# Patient Record
Sex: FEMALE | Race: WHITE | NOT HISPANIC OR LATINO
[De-identification: names, ages, dates, MRNs, and addresses within clinical notes are randomized per-mention and may not be internally consistent; named-entity substitution may affect disease eponyms.]

---

## 2019-05-13 ENCOUNTER — FORM ENCOUNTER (OUTPATIENT)
Age: 74
End: 2019-05-13

## 2020-09-14 ENCOUNTER — FORM ENCOUNTER (OUTPATIENT)
Age: 75
End: 2020-09-14

## 2021-09-03 PROBLEM — Z00.00 ENCOUNTER FOR PREVENTIVE HEALTH EXAMINATION: Status: ACTIVE | Noted: 2021-09-03

## 2021-09-24 DIAGNOSIS — Z87.448 PERSONAL HISTORY OF OTHER DISEASES OF URINARY SYSTEM: ICD-10-CM

## 2021-09-24 DIAGNOSIS — Z87.891 PERSONAL HISTORY OF NICOTINE DEPENDENCE: ICD-10-CM

## 2021-09-24 DIAGNOSIS — Z87.39 PERSONAL HISTORY OF OTHER DISEASES OF THE MUSCULOSKELETAL SYSTEM AND CONNECTIVE TISSUE: ICD-10-CM

## 2021-09-24 DIAGNOSIS — Z86.79 PERSONAL HISTORY OF OTHER DISEASES OF THE CIRCULATORY SYSTEM: ICD-10-CM

## 2021-09-24 DIAGNOSIS — Z85.3 PERSONAL HISTORY OF MALIGNANT NEOPLASM OF BREAST: ICD-10-CM

## 2021-09-24 DIAGNOSIS — Z80.3 FAMILY HISTORY OF MALIGNANT NEOPLASM OF BREAST: ICD-10-CM

## 2021-09-24 DIAGNOSIS — Z78.9 OTHER SPECIFIED HEALTH STATUS: ICD-10-CM

## 2021-09-27 ENCOUNTER — APPOINTMENT (OUTPATIENT)
Dept: BREAST CENTER | Facility: CLINIC | Age: 76
End: 2021-09-27
Payer: MEDICARE

## 2021-09-27 VITALS — OXYGEN SATURATION: 98 % | DIASTOLIC BLOOD PRESSURE: 76 MMHG | HEART RATE: 62 BPM | SYSTOLIC BLOOD PRESSURE: 140 MMHG

## 2021-09-27 VITALS — HEIGHT: 65 IN | BODY MASS INDEX: 19.66 KG/M2 | WEIGHT: 118 LBS

## 2021-09-27 PROCEDURE — 99213 OFFICE O/P EST LOW 20 MIN: CPT

## 2021-09-27 RX ORDER — LOSARTAN POTASSIUM 100 MG/1
TABLET, FILM COATED ORAL
Refills: 0 | Status: ACTIVE | COMMUNITY

## 2021-09-27 RX ORDER — AMLODIPINE BESYLATE 5 MG/1
TABLET ORAL
Refills: 0 | Status: ACTIVE | COMMUNITY

## 2021-09-27 RX ORDER — ALPRAZOLAM 2 MG/1
TABLET ORAL
Refills: 0 | Status: ACTIVE | COMMUNITY

## 2021-09-27 RX ORDER — ATENOLOL 50 MG/1
TABLET ORAL
Refills: 0 | Status: ACTIVE | COMMUNITY

## 2021-09-27 RX ORDER — PANTOPRAZOLE SODIUM 40 MG/10ML
INJECTION, POWDER, FOR SOLUTION INTRAVENOUS
Refills: 0 | Status: ACTIVE | COMMUNITY

## 2021-09-27 RX ORDER — MIRABEGRON 50 MG/1
TABLET, FILM COATED, EXTENDED RELEASE ORAL
Refills: 0 | Status: ACTIVE | COMMUNITY

## 2021-09-27 NOTE — REASON FOR VISIT
[Follow-Up: _____] : a [unfilled] follow-up visit [FreeTextEntry1] : The patient comes in with a strong family history of breast cancer.  Patient herself has a personal history of bilateral breast cancer with an initial right breast cancer diagnosed in 1997 and then a left breast cancer treated by Dr. Vika Wan in 2010.  She was found to be BRCA2 positive.  She underwent bilateral nipple sparing mastectomies through an inframammary approach with bilateral direct to implant reconstruction on January 6, 2011.  She was on Arimidex for 8 years and then was switched to tamoxifen by Dr. Dunbar.  She comes in for routine yearly follow-up.

## 2021-09-27 NOTE — PHYSICAL EXAM
[Normocephalic] : normocephalic [Atraumatic] : atraumatic [EOMI] : extra ocular movement intact [Supple] : supple [No Supraclavicular Adenopathy] : no supraclavicular adenopathy [No Cervical Adenopathy] : no cervical adenopathy [Examined in the supine and seated position] : examined in the supine and seated position [No dominant masses] : no dominant masses in right breast  [No dominant masses] : no dominant masses left breast [No Nipple Retraction] : no left nipple retraction [No Nipple Discharge] : no left nipple discharge [Breast Mass Right Breast ___cm] : no masses [Breast Mass Left Breast ___cm] : no masses [Breast Nipple Inversion] : nipples not inverted [Breast Nipple Retraction] : nipples not retracted [Breast Nipple Flattening] : nipples not flattened [Breast Nipple Fissures] : nipples not fissured [Breast Abnormal Lactation (Galactorrhea)] : no galactorrhea [Breast Abnormal Secretion Bloody Fluid] : no bloody discharge [Breast Abnormal Secretion Serous Fluid] : no serous discharge [Breast Abnormal Secretion Opalescent Fluid] : no milky discharge [No Axillary Lymphadenopathy] : no left axillary lymphadenopathy [No Edema] : no edema [No Rashes] : no rashes [No Ulceration] : no ulceration [de-identified] : On exam, the patient has bilateral nipple sparing mastectomies with implant reconstructions.  She has an excellent cosmetic result.  I cannot feel any suspicious findings over either implant.  She has no axillary, supraclavicular, or cervical adenopathy.  She has no sensation in the right nipple areolar complex or in the right inferior or right lateral aspects.  In the left breast she has good sensation in all 4 quadrants as well as the nipple areolar complex.  He does have a nodule on her right shoulder and she is seeing her dermatologist at the beginning of November. [de-identified] : Status post nipple sparing mastectomy with implant reconstruction with no suspicious findings. [de-identified] : Status post nipple sparing mastectomy with implant reconstruction with no suspicious findings.

## 2021-09-27 NOTE — HISTORY OF PRESENT ILLNESS
[FreeTextEntry1] : The patient is a 75-year-old G2, P2 postmenopausal white female.  She underwent menarche at age 12 and underwent menopause at age 51.  She never took any hormone replacement therapy.  The patient has a strong family history with a paternal grandmother with breast cancer at age 47 and then  at age 52.  The patient's father had breast cancer at age 93.  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  She had 23 negative lymph nodes at that time and received CMF chemotherapy and radiation therapy to the right breast.  She took Evista for 10 years.  She was then found to have a left breast cancer at the 5:00 region and underwent a partial mastectomy and sentinel lymph node biopsy in  by Dr. Vika Wan in [facility] for 6 mm invasive duct cancer intermediate grade with no lymphovascular invasion.  She had 5 negative nodes at that time and the tumor was ER/NE positive (pathologic prognostic stage IA).  She underwent genetic testing at the time of her cancer in  and turned out to be BRCA2 positive with an [id number] mutation.  The patient then underwent bilateral nipple sparing mastectomies which I performed on 2011 with bilateral direct to implant reconstructions and the final pathology was benign.  She was just placed on Arimidex for the left breast cancer which she took for 10 years until .  Oncotype score the left breast cancer was 28.  She time thought she had textured implants but her implants are actually smooth..  She comes in for routine yearly exam.

## 2021-09-27 NOTE — ASSESSMENT
[FreeTextEntry1] : The patient is a 75-year-old G2, P2 postmenopausal white female.  She underwent menarche at age 12 and underwent menopause at age 51.  She never took any hormone replacement therapy.  The patient has a strong family history with a paternal grandmother with breast cancer at age 47 and then  at age 52.  The patient's father had breast cancer at age 93.  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  She had 23 negative lymph nodes at that time and received CMF chemotherapy and radiation therapy to the right breast.  She took Evista for 10 years.  She was then found to have a left breast cancer at the 5:00 region and underwent a partial mastectomy and sentinel lymph node biopsy in  by Dr. Vika Wan in [facility] for 6 mm invasive duct cancer intermediate grade with no lymphovascular invasion.  She had 5 negative nodes at that time and the tumor was ER/NH positive (pathologic prognostic stage IA).  She underwent genetic testing at the time of her cancer in  and turned out to be BRCA2 positive with an [id number] mutation.  The patient then underwent bilateral nipple sparing mastectomies which I performed on 2011 with bilateral direct to implant reconstructions and the final pathology was benign.  She was just placed on Arimidex for the left breast cancer.  Oncotype score the left breast cancer was 28.  She at one time thought she had textured implants but actually has smooth implants.  On exam today, I cannot feel any suspicious findings over either implant and she is an excellent cosmetic result.  The patient was reassured and stopped Arimidex after 10 years of treatment in .  She should follow-up again in 1 year in 2022 for routine clinical exam.  Of note, she does have a nodule on her right shoulder which will need to be biopsied and she has an appointment with her dermatologist in 2021.

## 2022-09-24 ENCOUNTER — TRANSCRIPTION ENCOUNTER (OUTPATIENT)
Age: 77
End: 2022-09-24

## 2022-10-06 ENCOUNTER — NON-APPOINTMENT (OUTPATIENT)
Age: 77
End: 2022-10-06

## 2022-10-11 ENCOUNTER — APPOINTMENT (OUTPATIENT)
Dept: BREAST CENTER | Facility: CLINIC | Age: 77
End: 2022-10-11

## 2022-10-11 VITALS
DIASTOLIC BLOOD PRESSURE: 77 MMHG | WEIGHT: 116 LBS | SYSTOLIC BLOOD PRESSURE: 158 MMHG | BODY MASS INDEX: 19.33 KG/M2 | HEART RATE: 53 BPM | OXYGEN SATURATION: 98 % | HEIGHT: 65 IN

## 2022-10-11 PROCEDURE — 99213 OFFICE O/P EST LOW 20 MIN: CPT

## 2022-10-11 NOTE — HISTORY OF PRESENT ILLNESS
[FreeTextEntry1] : The patient is a 76-year-old G2, P2 postmenopausal white female.  She underwent menarche at age 12 and underwent menopause at age 51.  She never took any hormone replacement therapy.  The patient has a strong family history with a paternal grandmother with breast cancer at age 47 and then  at age 52.  The patient's father had breast cancer at age 93.  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  She had 23 negative lymph nodes at that time and received CMF chemotherapy and radiation therapy to the right breast.  She took Evista for 10 years.  She was then found to have a left breast cancer at the 5:00 region and underwent a partial mastectomy and sentinel lymph node biopsy in  by Dr. Vika Wan in [facility] for 6 mm invasive duct cancer intermediate grade with no lymphovascular invasion.  She had 5 negative nodes at that time and the tumor was ER/WA positive (pathologic prognostic stage IA).  She underwent genetic testing at the time of her cancer in  and turned out to be BRCA2 positive with an [id number] mutation.  The patient then underwent bilateral nipple sparing mastectomies which I performed on 2011 with bilateral direct to implant reconstructions and the final pathology was benign.  She was just placed on Arimidex for the left breast cancer which she took for 10 years until .  Oncotype score the left breast cancer was 28.  She time thought she had textured implants but her implants are actually smooth..  She comes in for routine yearly exam.

## 2022-10-11 NOTE — PHYSICAL EXAM
[Normocephalic] : normocephalic [Atraumatic] : atraumatic [EOMI] : extra ocular movement intact [Supple] : supple [No Supraclavicular Adenopathy] : no supraclavicular adenopathy [No Cervical Adenopathy] : no cervical adenopathy [Examined in the supine and seated position] : examined in the supine and seated position [No dominant masses] : no dominant masses in right breast  [No dominant masses] : no dominant masses left breast [No Nipple Retraction] : no left nipple retraction [No Nipple Discharge] : no left nipple discharge [Breast Mass Right Breast ___cm] : no masses [Breast Mass Left Breast ___cm] : no masses [No Axillary Lymphadenopathy] : no left axillary lymphadenopathy [No Edema] : no edema [No Rashes] : no rashes [No Ulceration] : no ulceration [Breast Nipple Inversion] : nipples not inverted [Breast Nipple Retraction] : nipples not retracted [Breast Nipple Flattening] : nipples not flattened [Breast Nipple Fissures] : nipples not fissured [Breast Abnormal Lactation (Galactorrhea)] : no galactorrhea [Breast Abnormal Secretion Bloody Fluid] : no bloody discharge [Breast Abnormal Secretion Serous Fluid] : no serous discharge [Breast Abnormal Secretion Opalescent Fluid] : no milky discharge [de-identified] : On exam, the patient has bilateral nipple sparing mastectomies with implant reconstructions.  She has an excellent cosmetic result.  I cannot feel any suspicious findings over either implant.  She has no axillary, supraclavicular, or cervical adenopathy.  She has no sensation in the right nipple areolar complex or in the right inferior or right lateral aspects.  In the left breast she has good sensation in all 4 quadrants as well as the nipple areolar complex.  [de-identified] : Status post nipple sparing mastectomy with implant reconstruction with no suspicious findings. [de-identified] : Status post nipple sparing mastectomy with implant reconstruction with no suspicious findings.

## 2022-10-11 NOTE — ASSESSMENT
[FreeTextEntry1] : The patient is a 76-year-old G2, P2 postmenopausal white female.  She underwent menarche at age 12 and underwent menopause at age 51.  She never took any hormone replacement therapy.  The patient has a strong family history with a paternal grandmother with breast cancer at age 47 and then  at age 52.  The patient's father had breast cancer at age 93.  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  She had 23 negative lymph nodes at that time and received CMF chemotherapy and radiation therapy to the right breast.  She took Evista for 10 years.  She was then found to have a left breast cancer at the 5:00 region and underwent a partial mastectomy and sentinel lymph node biopsy in  by Dr. Vika Wan in [facility] for 6 mm invasive duct cancer intermediate grade with no lymphovascular invasion.  She had 5 negative nodes at that time and the tumor was ER/UT positive (pathologic prognostic stage IA).  She underwent genetic testing at the time of her cancer in  and turned out to be BRCA2 positive with an [id number] mutation.  The patient then underwent bilateral nipple sparing mastectomies which I performed on 2011 with bilateral direct to implant reconstructions and the final pathology was benign.  She was just placed on Arimidex for the left breast cancer.  Oncotype score of the left breast cancer was 28.  She at one time thought she had textured implants but actually has smooth implants.  On exam today, I cannot feel any suspicious findings over either implant and she has an excellent cosmetic result.  The patient was reassured and stopped Arimidex after 10 years of treatment in .  She should follow-up again in 1 year in 2023 for routine clinical exam.

## 2023-04-11 ENCOUNTER — NON-APPOINTMENT (OUTPATIENT)
Age: 78
End: 2023-04-11

## 2023-04-17 ENCOUNTER — APPOINTMENT (OUTPATIENT)
Dept: BREAST CENTER | Facility: CLINIC | Age: 78
End: 2023-04-17
Payer: MEDICARE

## 2023-04-17 PROCEDURE — 99213 OFFICE O/P EST LOW 20 MIN: CPT

## 2023-04-17 NOTE — PHYSICAL EXAM
[Normocephalic] : normocephalic [Atraumatic] : atraumatic [EOMI] : extra ocular movement intact [Supple] : supple [No Supraclavicular Adenopathy] : no supraclavicular adenopathy [No Cervical Adenopathy] : no cervical adenopathy [Examined in the supine and seated position] : examined in the supine and seated position [No dominant masses] : no dominant masses in right breast  [No dominant masses] : no dominant masses left breast [No Nipple Retraction] : no left nipple retraction [No Nipple Discharge] : no left nipple discharge [Breast Mass Right Breast ___cm] : no masses [Breast Mass Left Breast ___cm] : no masses [No Axillary Lymphadenopathy] : no left axillary lymphadenopathy [No Edema] : no edema [No Rashes] : no rashes [No Ulceration] : no ulceration [Breast Nipple Inversion] : nipples not inverted [Breast Nipple Retraction] : nipples not retracted [Breast Nipple Flattening] : nipples not flattened [Breast Nipple Fissures] : nipples not fissured [Breast Abnormal Lactation (Galactorrhea)] : no galactorrhea [Breast Abnormal Secretion Bloody Fluid] : no bloody discharge [Breast Abnormal Secretion Serous Fluid] : no serous discharge [Breast Abnormal Secretion Opalescent Fluid] : no milky discharge [de-identified] : On exam, the patient has bilateral nipple sparing mastectomies with implant reconstructions.  She has an excellent cosmetic result.  I cannot feel any suspicious findings over either implant.  She has no axillary, supraclavicular, or cervical adenopathy.  She has no sensation in the right nipple areolar complex or in the right inferior or right lateral aspects.  In the left breast she has good sensation in all 4 quadrants as well as the nipple areolar complex.  [de-identified] : Status post nipple sparing mastectomy with implant reconstruction with no suspicious findings.  She has slight superficial telangiectasia in the skin of the right breast which is not clinically concerning. [de-identified] : Status post nipple sparing mastectomy with implant reconstruction with no suspicious findings.

## 2023-04-17 NOTE — HISTORY OF PRESENT ILLNESS
[FreeTextEntry1] : The patient is a 77-year-old G2, P2 postmenopausal white female.  She underwent menarche at age 12 and underwent menopause at age 51.  She never took any hormone replacement therapy.  The patient has a strong family history with a paternal grandmother with breast cancer at age 47 and then  at age 52.  The patient's father had breast cancer at age 93.  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  She had 23 negative lymph nodes at that time and received CMF chemotherapy and radiation therapy to the right breast.  She took Evista for 10 years.  She was then found to have a left breast cancer at the 5:00 region and underwent a partial mastectomy and sentinel lymph node biopsy in  by Dr. Vika Wan in [facility] for 6 mm invasive duct cancer intermediate grade with no lymphovascular invasion.  She had 5 negative nodes at that time and the tumor was ER/IN positive (pathologic prognostic stage IA).  She underwent genetic testing at the time of her cancer in  and turned out to be BRCA2 positive with an [id number] mutation.  The patient then underwent bilateral nipple sparing mastectomies which I performed on 2011 with bilateral direct to implant reconstructions and the final pathology was benign.  She was just placed on Arimidex for the left breast cancer which she took for 10 years until .  Oncotype score the left breast cancer was 28.  She thought she had textured implants but her implants are actually smooth.  She comes in for an interval exam after seeing some questionable venous discoloration on her right breast.

## 2023-04-17 NOTE — ASSESSMENT
[FreeTextEntry1] : The patient is a 77-year-old G2, P2 postmenopausal white female.  She underwent menarche at age 12 and underwent menopause at age 51.  She never took any hormone replacement therapy.  The patient has a strong family history with a paternal grandmother with breast cancer at age 47 and then  at age 52.  The patient's father had breast cancer at age 93.  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  She had 23 negative lymph nodes at that time and received CMF chemotherapy and radiation therapy to the right breast.  She took Evista for 10 years.  She was then found to have a left breast cancer at the 5:00 region and underwent a partial mastectomy and sentinel lymph node biopsy in  by Dr. Vika Wan in [facility] for 6 mm invasive duct cancer intermediate grade with no lymphovascular invasion.  She had 5 negative nodes at that time and the tumor was ER/ND positive (pathologic prognostic stage IA).  She underwent genetic testing at the time of her cancer in  and turned out to be BRCA2 positive with an [id number] mutation.  The patient then underwent bilateral nipple sparing mastectomies which I performed on 2011 with bilateral direct to implant reconstructions and the final pathology was benign.  She was just placed on Arimidex for the left breast cancer.  Oncotype score of the left breast cancer was 28.  She at one time thought she had textured implants but actually has smooth implants.  On exam today, I cannot feel any suspicious findings over either implant and she has an excellent cosmetic result.  The slightly purple-colored vein on the skin of the right breast is just a small telangiectasia and likely related to her prior radiation on the right side.  The patient was reassured and stopped Arimidex after 10 years of treatment in .  She should follow-up now in 1 year in 2024 for routine clinical exam.

## 2023-10-23 ENCOUNTER — APPOINTMENT (OUTPATIENT)
Dept: BREAST CENTER | Facility: CLINIC | Age: 78
End: 2023-10-23
Payer: MEDICARE

## 2023-10-23 DIAGNOSIS — Z15.09 GENETIC SUSCEPTIBILITY TO MALIGNANT NEOPLASM OF BREAST: ICD-10-CM

## 2023-10-23 DIAGNOSIS — Z90.13 ACQUIRED ABSENCE OF BILATERAL BREASTS AND NIPPLES: ICD-10-CM

## 2023-10-23 DIAGNOSIS — Z15.01 GENETIC SUSCEPTIBILITY TO MALIGNANT NEOPLASM OF BREAST: ICD-10-CM

## 2023-10-23 DIAGNOSIS — Z85.3 PERSONAL HISTORY OF MALIGNANT NEOPLASM OF BREAST: ICD-10-CM

## 2023-10-23 DIAGNOSIS — Z80.3 FAMILY HISTORY OF MALIGNANT NEOPLASM OF BREAST: ICD-10-CM

## 2023-10-23 PROCEDURE — 99212 OFFICE O/P EST SF 10 MIN: CPT

## 2024-09-06 ENCOUNTER — TRANSCRIPTION ENCOUNTER (OUTPATIENT)
Age: 79
End: 2024-09-06

## 2024-09-10 ENCOUNTER — NON-APPOINTMENT (OUTPATIENT)
Age: 79
End: 2024-09-10

## 2024-10-01 ENCOUNTER — LABORATORY RESULT (OUTPATIENT)
Age: 79
End: 2024-10-01

## 2024-10-02 ENCOUNTER — APPOINTMENT (OUTPATIENT)
Dept: HEMATOLOGY ONCOLOGY | Facility: CLINIC | Age: 79
End: 2024-10-02
Payer: MEDICARE

## 2024-10-02 VITALS
RESPIRATION RATE: 16 BRPM | TEMPERATURE: 98.7 F | HEIGHT: 65 IN | HEART RATE: 75 BPM | DIASTOLIC BLOOD PRESSURE: 78 MMHG | SYSTOLIC BLOOD PRESSURE: 176 MMHG | WEIGHT: 107.5 LBS | OXYGEN SATURATION: 100 % | BODY MASS INDEX: 17.91 KG/M2

## 2024-10-02 DIAGNOSIS — R35.89 OTHER POLYURIA: ICD-10-CM

## 2024-10-02 DIAGNOSIS — R63.4 ABNORMAL WEIGHT LOSS: ICD-10-CM

## 2024-10-02 DIAGNOSIS — M85.80 OTHER SPECIFIED DISORDERS OF BONE DENSITY AND STRUCTURE, UNSPECIFIED SITE: ICD-10-CM

## 2024-10-02 DIAGNOSIS — E73.9 LACTOSE INTOLERANCE, UNSPECIFIED: ICD-10-CM

## 2024-10-02 DIAGNOSIS — K64.9 UNSPECIFIED HEMORRHOIDS: ICD-10-CM

## 2024-10-02 DIAGNOSIS — D47.2 MONOCLONAL GAMMOPATHY: ICD-10-CM

## 2024-10-02 DIAGNOSIS — Z15.01 GENETIC SUSCEPTIBILITY TO MALIGNANT NEOPLASM OF BREAST: ICD-10-CM

## 2024-10-02 DIAGNOSIS — Z78.0 OTHER SPECIFIED DISORDERS OF BONE DENSITY AND STRUCTURE, UNSPECIFIED SITE: ICD-10-CM

## 2024-10-02 DIAGNOSIS — R61 GENERALIZED HYPERHIDROSIS: ICD-10-CM

## 2024-10-02 DIAGNOSIS — Z15.09 GENETIC SUSCEPTIBILITY TO MALIGNANT NEOPLASM OF BREAST: ICD-10-CM

## 2024-10-02 PROCEDURE — 99205 OFFICE O/P NEW HI 60 MIN: CPT

## 2024-10-02 NOTE — BEGINNING OF VISIT
[0] : 2) Feeling down, depressed, or hopeless: Not at all (0) [PHQ-2 Negative] : PHQ-2 Negative [[id number]] : 0 [Pain Scale: ___] : On a scale of 1-10, today the patient's pain is a(n) [unfilled]. [Never] : Never [Date Discussed (MM/DD/YY): ___] : Discussed: [unfilled] [Patient/Caregiver not ready to engage] : Patient/Caregiver not ready to engage

## 2024-10-02 NOTE — BEGINNING OF VISIT
[0] : 2) Feeling down, depressed, or hopeless: Not at all (0) [PHQ-2 Negative] : PHQ-2 Negative [[id number]] : 0 [Pain Scale: ___] : On a scale of 1-10, today the patient's pain is a(n) [unfilled]. [Never] : Never [Date Discussed (MM/DD/YY): ___] : Discussed: [unfilled] [Patient/Caregiver not ready to engage] : Patient/Caregiver not ready to engage

## 2024-10-02 NOTE — BEGINNING OF VISIT
[0] : 2) Feeling down, depressed, or hopeless: Not at all (0) [PHQ-2 Negative] : PHQ-2 Negative [[id number]] : 0 [Pain Scale: ___] : On a scale of 1-10, today the patient's pain is a(n) [unfilled]. [Never] : Never [Date Discussed (MM/DD/YY): ___] : Discussed: [unfilled] [Patient/Caregiver not ready to engage] : Patient/Caregiver not ready to engage

## 2024-10-03 LAB
ALBUMIN SERPL ELPH-MCNC: 4.6 G/DL
ALP BLD-CCNC: 59 U/L
ALT SERPL-CCNC: 21 U/L
ANION GAP SERPL CALC-SCNC: 26 MMOL/L
APPEARANCE: CLEAR
AST SERPL-CCNC: 20 U/L
B2 MICROGLOB SERPL-MCNC: 1.8 MG/L
BASOPHILS # BLD AUTO: 0.04 K/UL
BASOPHILS NFR BLD AUTO: 0.5 %
BILIRUB SERPL-MCNC: 0.3 MG/DL
BILIRUBIN URINE: NEGATIVE
BLOOD URINE: NEGATIVE
BUN SERPL-MCNC: 22 MG/DL
CALCIUM SERPL-MCNC: 9.5 MG/DL
CANCER AG19-9 SERPL-ACNC: 28 U/ML
CANCER AG27-29 SERPL-ACNC: 6.6 U/ML
CEA SERPL-MCNC: 2.1 NG/ML
CHLORIDE SERPL-SCNC: 99 MMOL/L
CO2 SERPL-SCNC: 20 MMOL/L
COLOR: YELLOW
CREAT SERPL-MCNC: 0.74 MG/DL
CREAT SPEC-SCNC: 27 MG/DL
CREAT/PROT UR: 0.3 RATIO
CRP SERPL-MCNC: <3 MG/L
DEPRECATED KAPPA LC FREE/LAMBDA SER: 0.19 RATIO
EGFR: 83 ML/MIN/1.73M2
EOSINOPHIL # BLD AUTO: 0.1 K/UL
EOSINOPHIL NFR BLD AUTO: 1.2 %
ERYTHROCYTE [SEDIMENTATION RATE] IN BLOOD BY WESTERGREN METHOD: 21 MM/HR
FERRITIN SERPL-MCNC: 63 NG/ML
FOLATE RBC-MCNC: 784 NG/ML
GLIADIN IGA SER QL: <0.2 U/ML
GLIADIN IGG SER QL: <0.4 U/ML
GLIADIN PEPTIDE IGA SER-ACNC: NEGATIVE
GLIADIN PEPTIDE IGG SER-ACNC: NEGATIVE
GLUCOSE QUALITATIVE U: NEGATIVE MG/DL
GLUCOSE SERPL-MCNC: 71 MG/DL
HAPTOGLOB SERPL-MCNC: 142 MG/DL
HCT VFR BLD CALC: 43.5 %
HCT VFR BLD CALC: 43.9 %
HGB BLD-MCNC: 13.8 G/DL
IGA SER QL IEP: 235 MG/DL
IGA SER QL IEP: 235 MG/DL
IGG SER QL IEP: 993 MG/DL
IGM SER QL IEP: 56 MG/DL
IMM GRANULOCYTES NFR BLD AUTO: 0.4 %
IRON SATN MFR SERPL: 24 %
IRON SERPL-MCNC: 99 UG/DL
KAPPA LC CSF-MCNC: 7.33 MG/DL
KAPPA LC SERPL-MCNC: 1.42 MG/DL
KETONES URINE: NEGATIVE MG/DL
LDH SERPL-CCNC: 260 U/L
LEUKOCYTE ESTERASE URINE: ABNORMAL
LYMPHOCYTES # BLD AUTO: 1.47 K/UL
LYMPHOCYTES NFR BLD AUTO: 17.6 %
MAGNESIUM SERPL-MCNC: 2.3 MG/DL
MAN DIFF?: NORMAL
MCHC RBC-ENTMCNC: 30.3 PG
MCHC RBC-ENTMCNC: 31.4 GM/DL
MCV RBC AUTO: 96.5 FL
MONOCYTES # BLD AUTO: 0.86 K/UL
MONOCYTES NFR BLD AUTO: 10.3 %
NEUTROPHILS # BLD AUTO: 5.86 K/UL
NEUTROPHILS NFR BLD AUTO: 70 %
NITRITE URINE: NEGATIVE
PH URINE: 5.5
PHOSPHATE SERPL-MCNC: 3.8 MG/DL
PLATELET # BLD AUTO: 308 K/UL
POTASSIUM SERPL-SCNC: 3.5 MMOL/L
PROT SERPL-MCNC: 7.7 G/DL
PROT UR-MCNC: 7 MG/DL
PROT UR-MCNC: 7 MG/DL
PROTEIN URINE: NEGATIVE MG/DL
RBC # BLD: 4.55 M/UL
RBC # BLD: 4.55 M/UL
RBC # FLD: 13.1 %
RETICS # AUTO: 1.2 %
RETICS AGGREG/RBC NFR: 52.8 K/UL
SODIUM SERPL-SCNC: 144 MMOL/L
SPECIFIC GRAVITY URINE: 1.01
TIBC SERPL-MCNC: 407 UG/DL
TSH SERPL-ACNC: 1.89 UIU/ML
TTG IGA SER IA-ACNC: <0.5 U/ML
TTG IGA SER-ACNC: NEGATIVE
TTG IGG SER IA-ACNC: <0.8 U/ML
TTG IGG SER IA-ACNC: NEGATIVE
UIBC SERPL-MCNC: 308 UG/DL
URATE SERPL-MCNC: 3 MG/DL
UROBILINOGEN URINE: 0.2 MG/DL
VIT B12 SERPL-MCNC: 396 PG/ML
WBC # FLD AUTO: 8.36 K/UL

## 2024-10-07 ENCOUNTER — NON-APPOINTMENT (OUTPATIENT)
Age: 79
End: 2024-10-07

## 2024-10-08 DIAGNOSIS — D48.7 NEOPLASM OF UNCERTAIN BEHAVIOR OF OTHER SPECIFIED SITES: ICD-10-CM

## 2024-10-08 LAB
ALBUMIN MFR SERPL ELPH: 62 %
ALBUMIN SERPL-MCNC: 4.8 G/DL
ALBUMIN/GLOB SERPL: 1.7 RATIO
ALBUPE: 17.6 %
ALPHA1 GLOB MFR SERPL ELPH: 3.6 %
ALPHA1 GLOB SERPL ELPH-MCNC: 0.3 G/DL
ALPHA1UPE: 38.8 %
ALPHA2 GLOB MFR SERPL ELPH: 10.1 %
ALPHA2 GLOB SERPL ELPH-MCNC: 0.8 G/DL
ALPHA2UPE: 16.4 %
B-GLOBULIN MFR SERPL ELPH: 12.2 %
B-GLOBULIN SERPL ELPH-MCNC: 0.9 G/DL
BETAUPE: 16.9 %
CGA SERPL-MCNC: 937.3 NG/ML
COPPER SERPL-MCNC: 110 UG/DL
EPO SERPL-MCNC: 6.1 MIU/ML
GAMMA GLOB FLD ELPH-MCNC: 0.9 G/DL
GAMMA GLOB MFR SERPL ELPH: 12.1 %
GAMMAUPE: 10.3 %
IGA 24H UR QL IFE: NORMAL
INTERPRETATION SERPL IEP-IMP: NORMAL
KAPPA LC 24H UR QL: NORMAL
M PROTEIN SPEC IFE-MCNC: NORMAL
PROT PATTERN 24H UR ELPH-IMP: NORMAL
PROT SERPL-MCNC: 7.7 G/DL
PROT SERPL-MCNC: 7.7 G/DL
PROT UR-MCNC: 9 MG/DL
PROT UR-MCNC: 9 MG/DL
ZINC SERPL-MCNC: 78 UG/DL

## 2024-10-14 ENCOUNTER — RESULT REVIEW (OUTPATIENT)
Age: 79
End: 2024-10-14

## 2024-10-14 PROBLEM — D48.7 NEOPLASM OF UNCERTAIN BEHAVIOR OF OTHER SPECIFIED SITES: Status: ACTIVE | Noted: 2024-10-14

## 2024-10-23 ENCOUNTER — APPOINTMENT (OUTPATIENT)
Dept: HEMATOLOGY ONCOLOGY | Facility: CLINIC | Age: 79
End: 2024-10-23
Payer: MEDICARE

## 2024-10-23 VITALS
HEART RATE: 69 BPM | OXYGEN SATURATION: 100 % | WEIGHT: 107.3 LBS | SYSTOLIC BLOOD PRESSURE: 181 MMHG | DIASTOLIC BLOOD PRESSURE: 80 MMHG | BODY MASS INDEX: 17.88 KG/M2 | HEIGHT: 65 IN | TEMPERATURE: 98.3 F | RESPIRATION RATE: 16 BRPM

## 2024-10-23 DIAGNOSIS — Z78.0 OTHER SPECIFIED DISORDERS OF BONE DENSITY AND STRUCTURE, UNSPECIFIED SITE: ICD-10-CM

## 2024-10-23 DIAGNOSIS — D47.2 MONOCLONAL GAMMOPATHY: ICD-10-CM

## 2024-10-23 DIAGNOSIS — M85.80 OTHER SPECIFIED DISORDERS OF BONE DENSITY AND STRUCTURE, UNSPECIFIED SITE: ICD-10-CM

## 2024-10-23 PROCEDURE — 99214 OFFICE O/P EST MOD 30 MIN: CPT

## 2024-10-23 RX ORDER — TOCOPHERSOLAN (VITAMIN E TPGS) 400/15ML
LIQUID (ML) ORAL
Refills: 0 | Status: ACTIVE | COMMUNITY

## 2024-10-23 RX ORDER — MAGNESIUM 200 MG
200 TABLET ORAL
Refills: 0 | Status: ACTIVE | COMMUNITY

## 2024-10-28 ENCOUNTER — APPOINTMENT (OUTPATIENT)
Dept: BREAST CENTER | Facility: CLINIC | Age: 79
End: 2024-10-28
Payer: MEDICARE

## 2024-10-28 VITALS
SYSTOLIC BLOOD PRESSURE: 176 MMHG | BODY MASS INDEX: 17.83 KG/M2 | HEIGHT: 65 IN | HEART RATE: 69 BPM | DIASTOLIC BLOOD PRESSURE: 71 MMHG | OXYGEN SATURATION: 99 % | WEIGHT: 107 LBS

## 2024-10-28 DIAGNOSIS — Z90.13 ACQUIRED ABSENCE OF BILATERAL BREASTS AND NIPPLES: ICD-10-CM

## 2024-10-28 DIAGNOSIS — Z15.01 GENETIC SUSCEPTIBILITY TO MALIGNANT NEOPLASM OF BREAST: ICD-10-CM

## 2024-10-28 DIAGNOSIS — Z15.09 GENETIC SUSCEPTIBILITY TO MALIGNANT NEOPLASM OF BREAST: ICD-10-CM

## 2024-10-28 DIAGNOSIS — Z85.3 PERSONAL HISTORY OF MALIGNANT NEOPLASM OF BREAST: ICD-10-CM

## 2024-10-28 DIAGNOSIS — Z80.3 FAMILY HISTORY OF MALIGNANT NEOPLASM OF BREAST: ICD-10-CM

## 2024-10-28 DIAGNOSIS — Z12.39 ENCOUNTER FOR OTHER SCREENING FOR MALIGNANT NEOPLASM OF BREAST: ICD-10-CM

## 2024-10-28 PROCEDURE — 99213 OFFICE O/P EST LOW 20 MIN: CPT

## 2024-10-28 PROCEDURE — G2211 COMPLEX E/M VISIT ADD ON: CPT

## 2025-05-12 ENCOUNTER — NON-APPOINTMENT (OUTPATIENT)
Age: 80
End: 2025-05-12

## 2025-05-14 ENCOUNTER — RESULT REVIEW (OUTPATIENT)
Age: 80
End: 2025-05-14

## 2025-05-14 ENCOUNTER — APPOINTMENT (OUTPATIENT)
Dept: HEMATOLOGY ONCOLOGY | Facility: CLINIC | Age: 80
End: 2025-05-14
Payer: MEDICARE

## 2025-05-14 DIAGNOSIS — Z90.13 ACQUIRED ABSENCE OF BILATERAL BREASTS AND NIPPLES: ICD-10-CM

## 2025-05-14 DIAGNOSIS — D48.7 NEOPLASM OF UNCERTAIN BEHAVIOR OF OTHER SPECIFIED SITES: ICD-10-CM

## 2025-05-14 DIAGNOSIS — M85.80 OTHER SPECIFIED DISORDERS OF BONE DENSITY AND STRUCTURE, UNSPECIFIED SITE: ICD-10-CM

## 2025-05-14 DIAGNOSIS — D47.2 MONOCLONAL GAMMOPATHY: ICD-10-CM

## 2025-05-14 DIAGNOSIS — Z78.0 OTHER SPECIFIED DISORDERS OF BONE DENSITY AND STRUCTURE, UNSPECIFIED SITE: ICD-10-CM

## 2025-05-14 DIAGNOSIS — Z15.01 GENETIC SUSCEPTIBILITY TO MALIGNANT NEOPLASM OF BREAST: ICD-10-CM

## 2025-05-14 DIAGNOSIS — Z15.09 GENETIC SUSCEPTIBILITY TO MALIGNANT NEOPLASM OF BREAST: ICD-10-CM

## 2025-05-14 PROCEDURE — 36415 COLL VENOUS BLD VENIPUNCTURE: CPT

## 2025-05-14 PROCEDURE — 99213 OFFICE O/P EST LOW 20 MIN: CPT

## 2025-05-23 ENCOUNTER — TRANSCRIPTION ENCOUNTER (OUTPATIENT)
Age: 80
End: 2025-05-23

## 2025-05-27 ENCOUNTER — TRANSCRIPTION ENCOUNTER (OUTPATIENT)
Age: 80
End: 2025-05-27

## 2025-06-13 ENCOUNTER — APPOINTMENT (OUTPATIENT)
Dept: HEMATOLOGY ONCOLOGY | Facility: CLINIC | Age: 80
End: 2025-06-13

## 2025-06-13 ENCOUNTER — RESULT REVIEW (OUTPATIENT)
Age: 80
End: 2025-06-13

## 2025-06-25 ENCOUNTER — TRANSCRIPTION ENCOUNTER (OUTPATIENT)
Age: 80
End: 2025-06-25